# Patient Record
Sex: MALE | URBAN - METROPOLITAN AREA
[De-identification: names, ages, dates, MRNs, and addresses within clinical notes are randomized per-mention and may not be internally consistent; named-entity substitution may affect disease eponyms.]

---

## 2020-01-24 NOTE — PROCEDURE NOTE: CLINICAL
PROCEDURE NOTE: Lucentis 0.5mg PFS OS. Diagnosis: Neovascular AMD with Active CNV. Prior to injection, risks/benefits/alternatives discussed including but not limited to infection, loss of vision or eye, hemorrhage, cataract, glaucoma, retinal tears or detachment. The patient wished to proceed with treatment. Topical anesthesia was induced with Alcaine. Additional anesthesia was achieved using drop(s) or injection checked above. A drop of Povidone-iodine 5% ophthalmic solution was instilled over the injection site and in the inferior fornix. Betadine prep was performed. A single use prefilled syringe of intravitreal Lucentis 0.5mg/0.05ml was used and excess was disposed of as waste. The needle was passed 3.0 mm posterior to the limbus in pseudophakic patients, and 3.5 mm posterior to the limbus in phakic patients. Injection Time 1142. Patient tolerated the procedure well. There were no complications. The eye was irrigated with sterile irrigating solution. Post procedure instructions given. The patient was instructed to use Artificial Tears q.i.d. p.r.n for comfort. The patient was instructed to return for re-evaluation in approximately 4-12 weeks depending on his/her condition and was told to call immediately if vision decreases and/or if his/her eye becomes red, painful, and/or light sensitive. The patient was instructed to go to the emergency room or call 911 if unable to reach the doctor within an hour or two of trying or calling. Fidel Hall PROCEDURE NOTE: Lucentis 0.5mg PFS OD. Diagnosis: Neovascular AMD with Active CNV. Anesthesia: Topical. Prep: Betadine Flush. Prior to injection, risks/benefits/alternatives discussed including but not limited to infection, loss of vision or eye, hemorrhage, cataract, glaucoma, retinal tears or detachment. The patient wished to proceed with treatment. Topical anesthesia was induced with Alcaine. Additional anesthesia was achieved using drop(s) or injection checked above. A drop of Povidone-iodine 5% ophthalmic solution was instilled over the injection site and in the inferior fornix. Betadine prep was performed. A single use prefilled syringe of intravitreal Lucentis 0.5mg/0.05ml was used and excess was disposed of as waste. The needle was passed 3.0 mm posterior to the limbus in pseudophakic patients, and 3.5 mm posterior to the limbus in phakic patients. Injection Time 12:20 PM. Patient tolerated the procedure well. There were no complications. The eye was irrigated with sterile irrigating solution. Post procedure instructions given. The patient was instructed to use Artificial Tears q.i.d. p.r.n for comfort. The patient was instructed to return for re-evaluation in approximately 4-12 weeks depending on his/her condition and was told to call immediately if vision decreases and/or if his/her eye becomes red, painful, and/or light sensitive. The patient was instructed to go to the emergency room or call 911 if unable to reach the doctor within an hour or two of trying or calling. Fidel Hall

## 2020-03-03 NOTE — PROCEDURE NOTE: CLINICAL

## 2020-04-10 NOTE — PROCEDURE NOTE: CLINICAL

## 2020-05-15 NOTE — PROCEDURE NOTE: CLINICAL
PROCEDURE NOTE: Lucentis 0.5mg PFS OD. Diagnosis: Neovascular AMD with Active CNV. Anesthesia: Lidocaine 4%. Prep: Betadine Flush. Prior to injection, risks/benefits/alternatives discussed including but not limited to infection, loss of vision or eye, hemorrhage, cataract, glaucoma, retinal tears or detachment. The patient wished to proceed with treatment. Topical anesthesia was induced with Alcaine. Additional anesthesia was achieved using drop(s) or injection checked above. A drop of Povidone-iodine 5% ophthalmic solution was instilled over the injection site and in the inferior fornix. Betadine prep was performed. A single use prefilled syringe of intravitreal Lucentis 0.5mg/0.05ml was used and excess was disposed of as waste. The needle was passed 3.0 mm posterior to the limbus in pseudophakic patients, and 3.5 mm posterior to the limbus in phakic patients. Injection Time 550. Patient tolerated the procedure well. There were no complications. The eye was irrigated with sterile irrigating solution. Post procedure instructions given. The patient was instructed to use Artificial Tears q.i.d. p.r.n for comfort. The patient was instructed to return for re-evaluation in approximately 4-12 weeks depending on his/her condition and was told to call immediately if vision decreases and/or if his/her eye becomes red, painful, and/or light sensitive. The patient was instructed to go to the emergency room or call 911 if unable to reach the doctor within an hour or two of trying or calling. Albin Villagomez PROCEDURE NOTE: Lucentis 0.5mg PFS OS. Diagnosis: Neovascular AMD with Active CNV. Anesthesia: Lidocaine 4%. Prep: Betadine Flush. Prior to injection, risks/benefits/alternatives discussed including but not limited to infection, loss of vision or eye, hemorrhage, cataract, glaucoma, retinal tears or detachment. The patient wished to proceed with treatment. Topical anesthesia was induced with Alcaine. Additional anesthesia was achieved using drop(s) or injection checked above. A drop of Povidone-iodine 5% ophthalmic solution was instilled over the injection site and in the inferior fornix. Betadine prep was performed. A single use prefilled syringe of intravitreal Lucentis 0.5mg/0.05ml was used and excess was disposed of as waste. The needle was passed 3.0 mm posterior to the limbus in pseudophakic patients, and 3.5 mm posterior to the limbus in phakic patients. Injection Time 550. Patient tolerated the procedure well. There were no complications. The eye was irrigated with sterile irrigating solution. Post procedure instructions given. The patient was instructed to use Artificial Tears q.i.d. p.r.n for comfort. The patient was instructed to return for re-evaluation in approximately 4-12 weeks depending on his/her condition and was told to call immediately if vision decreases and/or if his/her eye becomes red, painful, and/or light sensitive. The patient was instructed to go to the emergency room or call 911 if unable to reach the doctor within an hour or two of trying or calling. Albin Villagomez

## 2020-06-26 NOTE — PROCEDURE NOTE: CLINICAL
PROCEDURE NOTE: Lucentis 0.5mg PFS OD. Diagnosis: Neovascular AMD with Active CNV. Anesthesia: Lidocaine 4%. Prep: Betadine Flush. Prior to injection, risks/benefits/alternatives discussed including but not limited to infection, loss of vision or eye, hemorrhage, cataract, glaucoma, retinal tears or detachment. The patient wished to proceed with treatment. Topical anesthesia was induced with Alcaine. Additional anesthesia was achieved using drop(s) or injection checked above. A drop of Povidone-iodine 5% ophthalmic solution was instilled over the injection site and in the inferior fornix. Betadine prep was performed. A single use prefilled syringe of intravitreal Lucentis 0.5mg/0.05ml was used and excess was disposed of as waste. The needle was passed 3.0 mm posterior to the limbus in pseudophakic patients, and 3.5 mm posterior to the limbus in phakic patients. Injection Time 4:45 PM. Patient tolerated the procedure well. There were no complications. The eye was irrigated with sterile irrigating solution. Post procedure instructions given. The patient was instructed to use Artificial Tears q.i.d. p.r.n for comfort. The patient was instructed to return for re-evaluation in approximately 4-12 weeks depending on his/her condition and was told to call immediately if vision decreases and/or if his/her eye becomes red, painful, and/or light sensitive. The patient was instructed to go to the emergency room or call 911 if unable to reach the doctor within an hour or two of trying or calling. Siva Hanna PROCEDURE NOTE: Lucentis 0.5mg PFS OS. Diagnosis: Neovascular AMD with Active CNV. Anesthesia: Lidocaine 4%. Prep: Betadine Flush. Prior to injection, risks/benefits/alternatives discussed including but not limited to infection, loss of vision or eye, hemorrhage, cataract, glaucoma, retinal tears or detachment. The patient wished to proceed with treatment. Topical anesthesia was induced with Alcaine. Additional anesthesia was achieved using drop(s) or injection checked above. A drop of Povidone-iodine 5% ophthalmic solution was instilled over the injection site and in the inferior fornix. Betadine prep was performed. A single use prefilled syringe of intravitreal Lucentis 0.5mg/0.05ml was used and excess was disposed of as waste. The needle was passed 3.0 mm posterior to the limbus in pseudophakic patients, and 3.5 mm posterior to the limbus in phakic patients. Injection Time 4;45 PM. Patient tolerated the procedure well. There were no complications. The eye was irrigated with sterile irrigating solution. Post procedure instructions given. The patient was instructed to use Artificial Tears q.i.d. p.r.n for comfort. The patient was instructed to return for re-evaluation in approximately 4-12 weeks depending on his/her condition and was told to call immediately if vision decreases and/or if his/her eye becomes red, painful, and/or light sensitive. The patient was instructed to go to the emergency room or call 911 if unable to reach the doctor within an hour or two of trying or calling. Siva Hanna

## 2020-12-14 NOTE — PROCEDURE NOTE: CLINICAL

## 2020-12-14 NOTE — PATIENT DISCUSSION
LUCENTIS AND REPAT EVERY 5 WKS X 3 - MILD EDEMA AT 5 WKS - RETX AND KEEP 5 WKS - UNABLE TO GO LONGER.

## 2021-01-26 NOTE — PROCEDURE NOTE: CLINICAL
PROCEDURE NOTE: Lucentis 0.5mg PFS OD. Diagnosis: Neovascular AMD with Active CNV. Anesthesia: Lidocaine 4%. Prep: Betadine Flush. Prior to injection, risks/benefits/alternatives discussed including but not limited to infection, loss of vision or eye, hemorrhage, cataract, glaucoma, retinal tears or detachment. The patient wished to proceed with treatment. Topical anesthesia was induced with Alcaine. Additional anesthesia was achieved using drop(s) or injection checked above. A drop of Povidone-iodine 5% ophthalmic solution was instilled over the injection site and in the inferior fornix. Betadine prep was performed. A single use prefilled syringe of intravitreal Lucentis 0.5mg/0.05ml was used and excess was disposed of as waste. The needle was passed 3.0 mm posterior to the limbus in pseudophakic patients, and 3.5 mm posterior to the limbus in phakic patients. Injection Time 3:29PM. Patient tolerated the procedure well. There were no complications. The eye was irrigated with sterile irrigating solution. Post procedure instructions given. The patient was instructed to use Artificial Tears q.i.d. p.r.n for comfort. The patient was instructed to return for re-evaluation in approximately 4-12 weeks depending on his/her condition and was told to call immediately if vision decreases and/or if his/her eye becomes red, painful, and/or light sensitive. The patient was instructed to go to the emergency room or call 911 if unable to reach the doctor within an hour or two of trying or calling. Sandy Fried PROCEDURE NOTE: Lucentis 0.5mg PFS OS. Diagnosis: Neovascular AMD with Active CNV. Anesthesia: Lidocaine 4%. Prep: Betadine Flush. Prior to injection, risks/benefits/alternatives discussed including but not limited to infection, loss of vision or eye, hemorrhage, cataract, glaucoma, retinal tears or detachment. The patient wished to proceed with treatment. Topical anesthesia was induced with Alcaine. Additional anesthesia was achieved using drop(s) or injection checked above. A drop of Povidone-iodine 5% ophthalmic solution was instilled over the injection site and in the inferior fornix. Betadine prep was performed. A single use prefilled syringe of intravitreal Lucentis 0.5mg/0.05ml was used and excess was disposed of as waste. The needle was passed 3.0 mm posterior to the limbus in pseudophakic patients, and 3.5 mm posterior to the limbus in phakic patients. Injection Time 3:29PM. Patient tolerated the procedure well. There were no complications. The eye was irrigated with sterile irrigating solution. Post procedure instructions given. The patient was instructed to use Artificial Tears q.i.d. p.r.n for comfort. The patient was instructed to return for re-evaluation in approximately 4-12 weeks depending on his/her condition and was told to call immediately if vision decreases and/or if his/her eye becomes red, painful, and/or light sensitive. The patient was instructed to go to the emergency room or call 911 if unable to reach the doctor within an hour or two of trying or calling. Sandy Fried

## 2021-03-15 NOTE — PROCEDURE NOTE: CLINICAL
PROCEDURE NOTE: Lucentis 0.5mg PFS OD. Diagnosis: Neovascular AMD with Active CNV. Anesthesia: Lidocaine 4%. Prep: Betadine Flush. Prior to injection, risks/benefits/alternatives discussed including but not limited to infection, loss of vision or eye, hemorrhage, cataract, glaucoma, retinal tears or detachment. The patient wished to proceed with treatment. Topical anesthesia was induced with Alcaine. Additional anesthesia was achieved using drop(s) or injection checked above. A drop of Povidone-iodine 5% ophthalmic solution was instilled over the injection site and in the inferior fornix. Betadine prep was performed. A single use prefilled syringe of intravitreal Lucentis 0.5mg/0.05ml was used and excess was disposed of as waste. The needle was passed 3.0 mm posterior to the limbus in pseudophakic patients, and 3.5 mm posterior to the limbus in phakic patients. Injection Time 4:00 P. M. Patient tolerated the procedure well. There were no complications. The eye was irrigated with sterile irrigating solution. Post procedure instructions given. The patient was instructed to use Artificial Tears q.i.d. p.r.n for comfort. The patient was instructed to return for re-evaluation in approximately 4-12 weeks depending on his/her condition and was told to call immediately if vision decreases and/or if his/her eye becomes red, painful, and/or light sensitive. The patient was instructed to go to the emergency room or call 911 if unable to reach the doctor within an hour or two of trying or calling. Marta Man PROCEDURE NOTE: Lucentis 0.5mg PFS OS. Diagnosis: Neovascular AMD with Active CNV. Anesthesia: Lidocaine 4%. Prep: Betadine Flush. Prior to injection, risks/benefits/alternatives discussed including but not limited to infection, loss of vision or eye, hemorrhage, cataract, glaucoma, retinal tears or detachment. The patient wished to proceed with treatment. Topical anesthesia was induced with Alcaine. Additional anesthesia was achieved using drop(s) or injection checked above. A drop of Povidone-iodine 5% ophthalmic solution was instilled over the injection site and in the inferior fornix. Betadine prep was performed. A single use prefilled syringe of intravitreal Lucentis 0.5mg/0.05ml was used and excess was disposed of as waste. The needle was passed 3.0 mm posterior to the limbus in pseudophakic patients, and 3.5 mm posterior to the limbus in phakic patients. Injection Time 4:00 P. M. Patient tolerated the procedure well. There were no complications. The eye was irrigated with sterile irrigating solution. Post procedure instructions given. The patient was instructed to use Artificial Tears q.i.d. p.r.n for comfort. The patient was instructed to return for re-evaluation in approximately 4-12 weeks depending on his/her condition and was told to call immediately if vision decreases and/or if his/her eye becomes red, painful, and/or light sensitive. The patient was instructed to go to the emergency room or call 911 if unable to reach the doctor within an hour or two of trying or calling. Marta Man

## 2021-06-19 ENCOUNTER — IMPORTED ENCOUNTER (OUTPATIENT)
Dept: URBAN - METROPOLITAN AREA CLINIC 1 | Facility: CLINIC | Age: 66
End: 2021-06-19

## 2021-06-19 PROBLEM — H43.811: Noted: 2021-06-19

## 2021-06-19 PROBLEM — H35.033: Noted: 2021-06-19

## 2021-06-19 PROBLEM — H25.813: Noted: 2021-06-19

## 2021-06-19 PROBLEM — H40.023: Noted: 2021-06-19

## 2021-06-19 PROBLEM — H43.11: Noted: 2021-06-19

## 2021-06-19 PROCEDURE — 99204 OFFICE O/P NEW MOD 45 MIN: CPT

## 2021-06-19 NOTE — PATIENT DISCUSSION
PVD with Tear OD- Plan for Retinopexy. Discussed the risks benefits alternatives and limitations of Retinopexy. The patient stated a full understanding and a desire to proceed with the procedure.

## 2021-06-19 NOTE — PATIENT DISCUSSION
1.  PVD OD w/ Vitreous Heme No RD OD -- View of Macula obscured by vitreous hemorrhage but peripheral retinal in tact. RD precautions. 2.  Glaucoma Suspect OU -- (CD 0.8 OU) IOP stable. Negative Family Hx. Patient is considered high risk. Condition was discussed with patient and patient understands. Will plan baseline testing near future. 3.  Cataract OU -- Observe for now without intervention. The patient was advised to contact us if any change or worsening of vision4. GR I Hypertensive Retinopathy OU -- Stable continue HTN ControlReturn for an appointment in 10/dfe Tuesday with Dr. Anupama Thorne.

## 2021-06-19 NOTE — PATIENT DISCUSSION
Glaucoma Suspect OU (CD 0.8 OU) IOP stable. Negative Family Hx. Patient is considered high risk. Condition was discussed with patient and patient understands. Will continue to monitor patient for any progression in condition. Patient was advised to call us with any problems questions or concerns.

## 2021-06-22 ENCOUNTER — IMPORTED ENCOUNTER (OUTPATIENT)
Dept: URBAN - METROPOLITAN AREA CLINIC 1 | Facility: CLINIC | Age: 66
End: 2021-06-22

## 2021-06-22 PROBLEM — H43.11: Noted: 2021-06-22

## 2021-06-22 PROCEDURE — 92012 INTRM OPH EXAM EST PATIENT: CPT

## 2021-06-22 NOTE — PATIENT DISCUSSION
1.  Large PVD OD w/ Vitreous Hemorrhage No RD -- View of Macula obscured by vitreous hemorrhage but peripheral retinal in tact. RD precautions. Will refer patient to Retina for further evaluation. 2.  Glaucoma Suspect OU -- (CD 0.8 OU) IOP stable. Negative Family Hx. Patient is considered high risk. Condition was discussed with patient and patient understands. Will plan baseline testing near future. 3.  Cataract OU -- Observe for now without intervention. The patient was advised to contact us if any change or worsening of vision4. GR I Hypertensive Retinopathy OU -- Stable continue HTN ControlReturn for an appointment within this week with Retina (Dr. Fannie Edouard). Return for an appointment in 2 months for a 10/dfe/OCT/glare/Mrx with Dr. Debbie Rudd.

## 2021-08-17 NOTE — PATIENT DISCUSSION
LAST L TX OS 11 14 19 - HAS BEEN GETTING EVERY 5 WKS. Outreach attempt was made to schedule a Medicare Wellness Visit. This was the first attempt. Contact was not made, left message.

## 2021-08-31 ENCOUNTER — IMPORTED ENCOUNTER (OUTPATIENT)
Dept: URBAN - METROPOLITAN AREA CLINIC 1 | Facility: CLINIC | Age: 66
End: 2021-08-31

## 2021-08-31 PROBLEM — H25.813: Noted: 2021-08-31

## 2021-08-31 PROBLEM — H40.023: Noted: 2021-08-31

## 2021-08-31 PROCEDURE — 92015 DETERMINE REFRACTIVE STATE: CPT

## 2021-08-31 PROCEDURE — 92133 CPTRZD OPH DX IMG PST SGM ON: CPT

## 2021-08-31 PROCEDURE — 99213 OFFICE O/P EST LOW 20 MIN: CPT

## 2021-08-31 NOTE — PATIENT DISCUSSION
1.  Cataract OU -- Visually Significant secondary to glare discussed the risks benefits alternatives and limitations of cataract surgery. The patient stated a full understanding and a desire to proceed with the procedure. The patient will need to return for preop appointment with cataract measurements and to have any additional questions answered and start pre-operative eye drops as directed. Phaco PCL OS then ODOtherwise follow-up in 6 months 10/DFE/Glare2. Glaucoma Suspect OU -- (CD 0.8 OU) OCT WNL OU/ IOP stable. Negative Family Hx. Patient is considered high risk. Condition was discussed with patient and patient understands. 3.  Large PVD OD w/ Vitreous Hemorrhage -- Stable. Followed by Dr. James Morales pt to keep f/u appts. 4.  GR I Hypertensive Retinopathy OU -- Stable continue HTN ControlReturn for an appointment for Ascan/HP (OS then OD) with Dr. Dante Ramirez.

## 2021-09-20 ENCOUNTER — IMPORTED ENCOUNTER (OUTPATIENT)
Dept: URBAN - METROPOLITAN AREA CLINIC 1 | Facility: CLINIC | Age: 66
End: 2021-09-20

## 2021-09-20 PROBLEM — H25.812: Noted: 2021-09-20

## 2021-09-20 PROCEDURE — 92136 OPHTHALMIC BIOMETRY: CPT

## 2021-09-20 NOTE — PATIENT DISCUSSION
1. Cataract OS:  Visually Significant secondary to glare discussed the risks benefits alternatives and limitations of cataract surgery. The patient stated a full understanding and a desire to proceed with the procedure. Discussed with patient if PO Gtts are more than $120 for all three combined when filling at their Pharmacy please call our office to request generic substitutions. Pt understands they will need glasses post-op to achieve their best corrected vision. Phaco PCL OS  Lifestyle Questionnaire Completed. Return for an appointment for Return as scheduled with Dr. Heriberto Kothari.

## 2021-09-29 ENCOUNTER — IMPORTED ENCOUNTER (OUTPATIENT)
Dept: URBAN - METROPOLITAN AREA CLINIC 1 | Facility: CLINIC | Age: 66
End: 2021-09-29

## 2021-09-30 ENCOUNTER — IMPORTED ENCOUNTER (OUTPATIENT)
Dept: URBAN - METROPOLITAN AREA CLINIC 1 | Facility: CLINIC | Age: 66
End: 2021-09-30

## 2021-09-30 PROBLEM — Z96.1: Noted: 2021-09-30

## 2021-09-30 PROCEDURE — 99024 POSTOP FOLLOW-UP VISIT: CPT

## 2021-10-08 ENCOUNTER — IMPORTED ENCOUNTER (OUTPATIENT)
Dept: URBAN - METROPOLITAN AREA CLINIC 1 | Facility: CLINIC | Age: 66
End: 2021-10-08

## 2021-10-08 PROBLEM — H25.811: Noted: 2021-10-08

## 2021-10-08 PROCEDURE — 92136 OPHTHALMIC BIOMETRY: CPT

## 2021-10-08 NOTE — PATIENT DISCUSSION
1.  Cataract OD: Visually Significant secondary to glare discussed the risks benefits alternatives and limitations of cataract surgery. The patient stated a full understanding and a desire to proceed with the procedure. Discussed with patient if PO Gtts are more than $120 for all three combined when filling at their Pharmacy please call our office to request generic substitutions. Pt understands they will need glasses post-op to achieve their best corrected vision. Phaco PCL OD 2. POW#3  CE/IOL OS (Standard) doing well. Use Zylet BID OS & Prolensa Qdaily OS: Use through completion of po gtt regimen.  F/u as scheduled 2nd eye

## 2021-10-13 ENCOUNTER — IMPORTED ENCOUNTER (OUTPATIENT)
Dept: URBAN - METROPOLITAN AREA CLINIC 1 | Facility: CLINIC | Age: 66
End: 2021-10-13

## 2021-10-14 ENCOUNTER — IMPORTED ENCOUNTER (OUTPATIENT)
Dept: URBAN - METROPOLITAN AREA CLINIC 1 | Facility: CLINIC | Age: 66
End: 2021-10-14

## 2021-10-14 PROBLEM — Z96.1: Noted: 2021-10-14

## 2021-10-14 PROCEDURE — 99024 POSTOP FOLLOW-UP VISIT: CPT

## 2021-10-14 NOTE — PATIENT DISCUSSION
1. POD#1 Phaco/ PCL OD (Standard)- doing well. Use Zylet BID OD & Prolensa Qdaily OD: Use all three gtts through completion of PO gtt chart regimen/ Per our instructions given. Post op Warnings Reiterated 2.  POW#3 Phaco/ PCL OS (Standard) - doing well Use Zylet BID OS & Prolensa Qdaily OSRTC as scheduled

## 2021-12-17 NOTE — PATIENT DISCUSSION
12 17 21 TRACE EDEMA AT 7 WKS - LUCENTID AND KEEP 7 WKS AS DOING - HAS BEEN GOING 5 WKS - ON FURTHER REVIEW PT WANTS TO GO 6 WKS AS SHE IS WORIED LONGER COUL BE A PROBLEM.

## 2021-12-17 NOTE — PATIENT DISCUSSION
12 17 21 MILD EDEMA - DR ROBERSON ELECTED TO STOP TX OS AS VA WAS LIMITED - REVIEWED WITH PT OPTIONS OF TX VS OBSERVATION - SHE WANTS TO THINK ABOU IT MORE - FOR NOW SHE WANTS TO STOP TREATMENT - ON FURTHER THOUGHT SHE WANTS TO CONTINUE WITH TREATMENT SINCE SHE IS COMING IN FOR TX OD ANYWAY TO MAINTAIN WHAT SHE HAS IN CASE OD GETS WORSE.

## 2021-12-17 NOTE — PROCEDURE NOTE: CLINICAL
PROCEDURE NOTE: Lucentis 0.5mg PFS OD. Diagnosis: Neovascular AMD with Active CNV. Anesthesia: Lidocaine 4%. Prep: Betadine Flush. Prior to injection, risks/benefits/alternatives discussed including but not limited to infection, loss of vision or eye, hemorrhage, cataract, glaucoma, retinal tears or detachment. The patient wished to proceed with treatment. Topical anesthesia was induced with Alcaine. Additional anesthesia was achieved using drop(s) or injection checked above. A drop of Povidone-iodine 5% ophthalmic solution was instilled over the injection site and in the inferior fornix. Betadine prep was performed. A single use prefilled syringe of intravitreal Lucentis 0.5mg/0.05ml was used and excess was disposed of as waste. The needle was passed 3.0 mm posterior to the limbus in pseudophakic patients, and 3.5 mm posterior to the limbus in phakic patients. Injection Time 12:00 PM. Patient tolerated the procedure well. There were no complications. The eye was irrigated with sterile irrigating solution. Post procedure instructions given. The patient was instructed to use Artificial Tears q.i.d. p.r.n for comfort. The patient was instructed to return for re-evaluation in approximately 4-12 weeks depending on his/her condition and was told to call immediately if vision decreases and/or if his/her eye becomes red, painful, and/or light sensitive. The patient was instructed to go to the emergency room or call 911 if unable to reach the doctor within an hour or two of trying or calling. [place] PROCEDURE NOTE: Lucentis 0.5mg PFS OS. Diagnosis: Neovascular AMD with Active CNV. Anesthesia: Topical. Prep: Betadine Flush. Prior to injection, risks/benefits/alternatives discussed including but not limited to infection, loss of vision or eye, hemorrhage, cataract, glaucoma, retinal tears or detachment. The patient wished to proceed with treatment. Topical anesthesia was induced with Alcaine. Additional anesthesia was achieved using drop(s) or injection checked above. A drop of Povidone-iodine 5% ophthalmic solution was instilled over the injection site and in the inferior fornix. Betadine prep was performed. A single use prefilled syringe of intravitreal Lucentis 0.5mg/0.05ml was used and excess was disposed of as waste. The needle was passed 3.0 mm posterior to the limbus in pseudophakic patients, and 3.5 mm posterior to the limbus in phakic patients. Injection Time *. Patient tolerated the procedure well. There were no complications. The eye was irrigated with sterile irrigating solution. Post procedure instructions given. The patient was instructed to use Artificial Tears q.i.d. p.r.n for comfort. The patient was instructed to return for re-evaluation in approximately 4-12 weeks depending on his/her condition and was told to call immediately if vision decreases and/or if his/her eye becomes red, painful, and/or light sensitive. The patient was instructed to go to the emergency room or call 911 if unable to reach the doctor within an hour or two of trying or calling. Saima Johnson

## 2022-03-10 NOTE — PROCEDURE NOTE: CLINICAL
PROCEDURE NOTE: Lucentis 0.5mg PFS OS. Diagnosis: Neovascular AMD with Active CNV. Anesthesia: Lidocaine. Prep: Betadine Flush. Prior to injection, risks/benefits/alternatives discussed including but not limited to infection, loss of vision or eye, hemorrhage, cataract, glaucoma, retinal tears or detachment. The patient wished to proceed with treatment. Topical anesthesia was induced with Alcaine. Additional anesthesia was achieved using drop(s) or injection checked above. A drop of Povidone-iodine 5% ophthalmic solution was instilled over the injection site and in the inferior fornix. Betadine prep was performed. A single use prefilled syringe of intravitreal Lucentis 0.5mg/0.05ml was used and excess was disposed of as waste. The needle was passed 3.0 mm posterior to the limbus in pseudophakic patients, and 3.5 mm posterior to the limbus in phakic patients. Injection Time 9:50 AM. Patient tolerated the procedure well. There were no complications. The eye was irrigated with sterile irrigating solution. Post procedure instructions given. The patient was instructed to use Artificial Tears q.i.d. p.r.n for comfort. The patient was instructed to return for re-evaluation in approximately 4-12 weeks depending on his/her condition and was told to call immediately if vision decreases and/or if his/her eye becomes red, painful, and/or light sensitive. The patient was instructed to go to the emergency room or call 911 if unable to reach the doctor within an hour or two of trying or calling. [facility] PROCEDURE NOTE: Lucentis 0.5mg PFS OD. Diagnosis: Neovascular AMD with Active CNV. Anesthesia: Lidocaine. Prep: Betadine Flush. Prior to injection, risks/benefits/alternatives discussed including but not limited to infection, loss of vision or eye, hemorrhage, cataract, glaucoma, retinal tears or detachment. The patient wished to proceed with treatment. Topical anesthesia was induced with Alcaine. Additional anesthesia was achieved using drop(s) or injection checked above. A drop of Povidone-iodine 5% ophthalmic solution was instilled over the injection site and in the inferior fornix. Betadine prep was performed. A single use prefilled syringe of intravitreal Lucentis 0.5mg/0.05ml was used and excess was disposed of as waste. The needle was passed 3.0 mm posterior to the limbus in pseudophakic patients, and 3.5 mm posterior to the limbus in phakic patients. Injection Time 9:50 AM. Patient tolerated the procedure well. There were no complications. The eye was irrigated with sterile irrigating solution. Post procedure instructions given. The patient was instructed to use Artificial Tears q.i.d. p.r.n for comfort. The patient was instructed to return for re-evaluation in approximately 4-12 weeks depending on his/her condition and was told to call immediately if vision decreases and/or if his/her eye becomes red, painful, and/or light sensitive. The patient was instructed to go to the emergency room or call 911 if unable to reach the doctor within an hour or two of trying or calling. Bhavesh Redmond
WDL

## 2022-04-02 ASSESSMENT — VISUAL ACUITY
OS_CC: 20/60
OD_CC: 20/50
OD_SC: 20/150
OS_SC: 20/20
OD_SC: 20/40-2
OD_CC: 20/50-2
OS_SC: 20/20
OD_PH: CC 20/40 +1
OD_PH: CC 20/40 +1
OS_CC: 20/20
OS_SC: 20/20
OD_GLARE: 20/100
OS_CC: 20/20
OD_SC: 20/150
OS_CC: 20/20
OS_GLARE: 20/100
OD_SC: 20/40-2
OD_CC: 20/20
OS_GLARE: 20/100

## 2022-04-02 ASSESSMENT — TONOMETRY
OS_IOP_MMHG: 15
OS_IOP_MMHG: 13
OD_IOP_MMHG: 23
OD_IOP_MMHG: 16
OD_IOP_MMHG: 16
OS_IOP_MMHG: 23
OS_IOP_MMHG: 15
OS_IOP_MMHG: 13
OD_IOP_MMHG: 13
OS_IOP_MMHG: 17
OD_IOP_MMHG: 17

## 2022-12-08 NOTE — PATIENT DISCUSSION
Call to pt.  Advise per Dr Roper note.  Verb understanding.    Cataract DOES NOT appear visually significant.

## 2022-12-15 NOTE — PROCEDURE NOTE: CLINICAL
PROCEDURE NOTE: Lucentis 0.5mg PFS OD. Diagnosis: Neovascular AMD with Active CNV. Anesthesia: Lidocaine 4%. Prep: Betadine Flush. Prior to injection, risks/benefits/alternatives discussed including but not limited to infection, loss of vision or eye, hemorrhage, cataract, glaucoma, retinal tears or detachment. The patient wished to proceed with treatment. Topical anesthesia was induced with Alcaine. Additional anesthesia was achieved using drop(s) or injection checked above. A drop of Povidone-iodine 5% ophthalmic solution was instilled over the injection site and in the inferior fornix. Betadine prep was performed. A single use prefilled syringe of intravitreal Lucentis 0.5mg/0.05ml was used and excess was disposed of as waste. The needle was passed 3.0 mm posterior to the limbus in pseudophakic patients, and 3.5 mm posterior to the limbus in phakic patients. Injection Time 4:00 PM. Patient tolerated the procedure well. There were no complications. The eye was irrigated with sterile irrigating solution. Post procedure instructions given. The patient was instructed to use Artificial Tears q.i.d. p.r.n for comfort. The patient was instructed to return for re-evaluation in approximately 4-12 weeks depending on his/her condition and was told to call immediately if vision decreases and/or if his/her eye becomes red, painful, and/or light sensitive. The patient was instructed to go to the emergency room or call 911 if unable to reach the doctor within an hour or two of trying or calling. INVELTYS GIVEN.